# Patient Record
Sex: MALE | HISPANIC OR LATINO | URBAN - METROPOLITAN AREA
[De-identification: names, ages, dates, MRNs, and addresses within clinical notes are randomized per-mention and may not be internally consistent; named-entity substitution may affect disease eponyms.]

---

## 2021-07-05 ENCOUNTER — EMERGENCY (EMERGENCY)
Facility: HOSPITAL | Age: 31
LOS: 1 days | Discharge: HOME | End: 2021-07-05
Attending: HOSPITALIST | Admitting: HOSPITALIST
Payer: MEDICAID

## 2021-07-05 VITALS
DIASTOLIC BLOOD PRESSURE: 51 MMHG | OXYGEN SATURATION: 100 % | RESPIRATION RATE: 20 BRPM | SYSTOLIC BLOOD PRESSURE: 154 MMHG | TEMPERATURE: 98 F | HEART RATE: 118 BPM

## 2021-07-05 PROCEDURE — 99284 EMERGENCY DEPT VISIT MOD MDM: CPT

## 2021-07-05 NOTE — ED ADULT TRIAGE NOTE - CHIEF COMPLAINT QUOTE
Brought in by EMS and [facility] Police for verbalizing suicidal ideation. Pt in custody, arrived in handcuffs. Admits to drinking 12 beers. Calm and cooperative at this time. Denies drug use. Denies psych or medical hx.

## 2021-07-06 VITALS
HEART RATE: 70 BPM | OXYGEN SATURATION: 100 % | RESPIRATION RATE: 12 BRPM | TEMPERATURE: 98 F | SYSTOLIC BLOOD PRESSURE: 170 MMHG | DIASTOLIC BLOOD PRESSURE: 81 MMHG

## 2021-07-06 LAB
ALBUMIN SERPL ELPH-MCNC: 4.9 G/DL — SIGNIFICANT CHANGE UP (ref 3.3–5)
ALP SERPL-CCNC: 114 U/L — SIGNIFICANT CHANGE UP (ref 40–120)
ALT FLD-CCNC: 74 U/L — HIGH (ref 4–41)
AMPHET UR-MCNC: NEGATIVE — SIGNIFICANT CHANGE UP
ANION GAP SERPL CALC-SCNC: 15 MMOL/L — HIGH (ref 7–14)
APAP SERPL-MCNC: <15 UG/ML — SIGNIFICANT CHANGE UP (ref 15–25)
APPEARANCE UR: CLEAR — SIGNIFICANT CHANGE UP
AST SERPL-CCNC: 41 U/L — HIGH (ref 4–40)
BARBITURATES UR SCN-MCNC: NEGATIVE — SIGNIFICANT CHANGE UP
BASOPHILS # BLD AUTO: 0.04 K/UL — SIGNIFICANT CHANGE UP (ref 0–0.2)
BASOPHILS NFR BLD AUTO: 0.6 % — SIGNIFICANT CHANGE UP (ref 0–2)
BENZODIAZ UR-MCNC: NEGATIVE — SIGNIFICANT CHANGE UP
BILIRUB SERPL-MCNC: 0.2 MG/DL — SIGNIFICANT CHANGE UP (ref 0.2–1.2)
BILIRUB UR-MCNC: NEGATIVE — SIGNIFICANT CHANGE UP
BUN SERPL-MCNC: 7 MG/DL — SIGNIFICANT CHANGE UP (ref 7–23)
CALCIUM SERPL-MCNC: 9.5 MG/DL — SIGNIFICANT CHANGE UP (ref 8.4–10.5)
CHLORIDE SERPL-SCNC: 106 MMOL/L — SIGNIFICANT CHANGE UP (ref 98–107)
CO2 SERPL-SCNC: 21 MMOL/L — LOW (ref 22–31)
COCAINE METAB.OTHER UR-MCNC: NEGATIVE — SIGNIFICANT CHANGE UP
COLOR SPEC: COLORLESS — SIGNIFICANT CHANGE UP
COVID-19 SPIKE DOMAIN AB INTERP: POSITIVE
COVID-19 SPIKE DOMAIN ANTIBODY RESULT: >250 U/ML — HIGH
CREAT SERPL-MCNC: 0.88 MG/DL — SIGNIFICANT CHANGE UP (ref 0.5–1.3)
CREATININE URINE RESULT, DAU: 24 MG/DL — SIGNIFICANT CHANGE UP
DIFF PNL FLD: NEGATIVE — SIGNIFICANT CHANGE UP
EOSINOPHIL # BLD AUTO: 0.05 K/UL — SIGNIFICANT CHANGE UP (ref 0–0.5)
EOSINOPHIL NFR BLD AUTO: 0.7 % — SIGNIFICANT CHANGE UP (ref 0–6)
ETHANOL SERPL-MCNC: 209 MG/DL — HIGH
GLUCOSE SERPL-MCNC: 106 MG/DL — HIGH (ref 70–99)
GLUCOSE UR QL: NEGATIVE — SIGNIFICANT CHANGE UP
HCT VFR BLD CALC: 45.5 % — SIGNIFICANT CHANGE UP (ref 39–50)
HGB BLD-MCNC: 16.1 G/DL — SIGNIFICANT CHANGE UP (ref 13–17)
IANC: 4.2 K/UL — SIGNIFICANT CHANGE UP (ref 1.5–8.5)
IMM GRANULOCYTES NFR BLD AUTO: 0.1 % — SIGNIFICANT CHANGE UP (ref 0–1.5)
KETONES UR-MCNC: NEGATIVE — SIGNIFICANT CHANGE UP
LEUKOCYTE ESTERASE UR-ACNC: NEGATIVE — SIGNIFICANT CHANGE UP
LYMPHOCYTES # BLD AUTO: 1.94 K/UL — SIGNIFICANT CHANGE UP (ref 1–3.3)
LYMPHOCYTES # BLD AUTO: 28.8 % — SIGNIFICANT CHANGE UP (ref 13–44)
MCHC RBC-ENTMCNC: 30.7 PG — SIGNIFICANT CHANGE UP (ref 27–34)
MCHC RBC-ENTMCNC: 35.4 GM/DL — SIGNIFICANT CHANGE UP (ref 32–36)
MCV RBC AUTO: 86.7 FL — SIGNIFICANT CHANGE UP (ref 80–100)
METHADONE UR-MCNC: NEGATIVE — SIGNIFICANT CHANGE UP
MONOCYTES # BLD AUTO: 0.49 K/UL — SIGNIFICANT CHANGE UP (ref 0–0.9)
MONOCYTES NFR BLD AUTO: 7.3 % — SIGNIFICANT CHANGE UP (ref 2–14)
NEUTROPHILS # BLD AUTO: 4.2 K/UL — SIGNIFICANT CHANGE UP (ref 1.8–7.4)
NEUTROPHILS NFR BLD AUTO: 62.5 % — SIGNIFICANT CHANGE UP (ref 43–77)
NITRITE UR-MCNC: NEGATIVE — SIGNIFICANT CHANGE UP
NRBC # BLD: 0 /100 WBCS — SIGNIFICANT CHANGE UP
NRBC # FLD: 0 K/UL — SIGNIFICANT CHANGE UP
OPIATES UR-MCNC: NEGATIVE — SIGNIFICANT CHANGE UP
OXYCODONE UR-MCNC: NEGATIVE — SIGNIFICANT CHANGE UP
PCP SPEC-MCNC: SIGNIFICANT CHANGE UP
PCP UR-MCNC: NEGATIVE — SIGNIFICANT CHANGE UP
PH UR: 6 — SIGNIFICANT CHANGE UP (ref 5–8)
PLATELET # BLD AUTO: 178 K/UL — SIGNIFICANT CHANGE UP (ref 150–400)
POTASSIUM SERPL-MCNC: 4 MMOL/L — SIGNIFICANT CHANGE UP (ref 3.5–5.3)
POTASSIUM SERPL-SCNC: 4 MMOL/L — SIGNIFICANT CHANGE UP (ref 3.5–5.3)
PROT SERPL-MCNC: 7.8 G/DL — SIGNIFICANT CHANGE UP (ref 6–8.3)
PROT UR-MCNC: NEGATIVE — SIGNIFICANT CHANGE UP
RBC # BLD: 5.25 M/UL — SIGNIFICANT CHANGE UP (ref 4.2–5.8)
RBC # FLD: 11.8 % — SIGNIFICANT CHANGE UP (ref 10.3–14.5)
SALICYLATES SERPL-MCNC: <5 MG/DL — LOW (ref 15–30)
SARS-COV-2 IGG+IGM SERPL QL IA: >250 U/ML — HIGH
SARS-COV-2 IGG+IGM SERPL QL IA: POSITIVE
SARS-COV-2 RNA SPEC QL NAA+PROBE: SIGNIFICANT CHANGE UP
SODIUM SERPL-SCNC: 142 MMOL/L — SIGNIFICANT CHANGE UP (ref 135–145)
SP GR SPEC: 1.01 — LOW (ref 1.01–1.02)
THC UR QL: NEGATIVE — SIGNIFICANT CHANGE UP
TSH SERPL-MCNC: 1.76 UIU/ML — SIGNIFICANT CHANGE UP (ref 0.27–4.2)
UROBILINOGEN FLD QL: SIGNIFICANT CHANGE UP
WBC # BLD: 6.73 K/UL — SIGNIFICANT CHANGE UP (ref 3.8–10.5)
WBC # FLD AUTO: 6.73 K/UL — SIGNIFICANT CHANGE UP (ref 3.8–10.5)

## 2021-07-06 NOTE — ED PROVIDER NOTE - PATIENT PORTAL LINK FT
You can access the FollowMyHealth Patient Portal offered by [facility] by registering at the following website: [email or web address]. By joining [facility]’s FollowMyHealth portal, you will also be able to view your health information using other applications (apps) compatible with our system.

## 2021-07-06 NOTE — ED PROVIDER NOTE - NSFOLLOWUPINSTRUCTIONS_ED_ALL_ED_FT
You were seen in the Emergency Department for: suicidal thoughts, intoxication    For pain/fever, you may take Tylenol (acetaminophen) 650 mg every 6 hours, OR Advil (ibuprofen) 600 mg every 8 hours.    Please follow up with your primary physician as discussed. If you do not have a primary physician or specialist of your needs, please call [phone] to find one convenient for you. At this number you will be able to locate a provider who accepts your insurance, as well as locate the right specialist for your needs.    You should return to the Emergency Department if you feel any new/worsening/persistent symptoms including but not limited to: chest pain, difficulty breathing, loss of consciousness, bleeding, uncontrolled pain, numbness/weakness of a body part

## 2021-07-06 NOTE — ED ADULT NURSE NOTE - CHIEF COMPLAINT QUOTE
Brought in by EMS and [facility] Police for verbalizing suicidal ideation. Pt in custody, arrived in handcuffs. Admits to drinking 12 beers. Calm and cooperative at this time. Denies drug use. Denies psych or medical hx.

## 2021-07-06 NOTE — ED PROVIDER NOTE - PROGRESS NOTE DETAILS
Tanvir PGY-2: Patient reassessed. Appears clinically sober. States that he does not remember what he said previously, explicitly and clearly denies SI/HI/hallucinations. Reports that he "feels good." Wants to be discharged home. AOx4, clinically appears to be able to have capacity to make decision, will discharge with return precautions.

## 2021-07-06 NOTE — ED PROVIDER NOTE - OBJECTIVE STATEMENT
30 year old male presenting under police custody for suicidal ideation. States here "I didn't do anything, but they are saying I did, I want to kill myself". Endorses prior suicide attempts, does not give details. No known psychiatric history. Reports drinking ~6 beers. Denies hallucinations, drug use.     ID: [id number] 30 year old male presenting under police custody for suicidal ideation. States here "I didn't do anything, but they are saying I did, I want to kill myself". Endorses prior suicide attempts, does not give details. No known psychiatric history. Reports drinking ~6 beers. Denies hallucinations, drug use.      ID: [id number]

## 2021-07-06 NOTE — ED PROVIDER NOTE - ATTENDING CONTRIBUTION TO CARE
PI ID for [place] # [id number]     30M [employer] after expressing SI. patient was intoxicated earlier and was accused of inappropriately touching a young female child. Police called and patient arrested. he then stated he wanted to kill himself because he didn't want to got o [facility] again. reports hx of attempted suicide in the past. no hi/hallucinations.    ***GEN - crying, A+O x3 ***HEAD - NC/AT ***EYES/NOSE - PERRL, EOMI, mucous membranes moist, no discharge ***THROAT: Oral cavity and pharynx normal. No inflammation, swelling, exudate, or lesions.  ***NECK: Neck supple, non-tender without lymphadenopathy, no masses, no thyromegaly.   ***PULMONARY - CTA b/l, symmetric breath sounds. ***CARDIAC -s1s2, RRR, no M,G,R  ***ABDOMEN - +BS, ND, NT, soft, no guarding, no rebound, no masses   ***BACK - no CVA tenderness, Normal  spine ***EXTREMITIES - symmetric pulses, 2+ dp, capillary refill < 2 seconds, no clubbing, no cyanosis, no edema ***SKIN - no rash or bruising   ***NEUROLOGIC - alert, CN 2-12 intact    MDM: 30M with SI and currently under arrest. labs, psych eval.

## 2021-07-06 NOTE — ED ADULT NURSE NOTE - OBJECTIVE STATEMENT
Pt received to room 17 a&ox4, ambulatory at baseline c/o psych eval. Pt in custody in handcuffs. Police officers at bedside. Pt appears intoxicated at this time. Pt admits to drinking about 6 beers earlier today. As per , pt was laughing and very talkative on scene, but as soon as the pt was placed in handcuffs he stated that he wanted to kill himself. Pt states, "I want to hurt myself a little bit for about 3-4 days." Pt also states, "I sometimes want to hurt other people." Pt in no acute distress at this time. Respirations even and unlabored. MD at bedside for eval. Comfort measures provided. Pt received to room 17 a&ox4, ambulatory at baseline c/o psych eval. Pt in custody in handcuffs. Police officers at bedside. Pt appears intoxicated at this time. Pt admits to drinking about 6 beers earlier today. As per , pt was laughing and very talkative on scene, but as soon as the pt was placed in handcuffs he stated that he wanted to kill himself. Pt states, "I want to hurt myself a little bit for about 3-4 days." Pt also states, "I sometimes want to hurt other people." Pt in no acute distress at this time. Respirations even and unlabored. MD at bedside for eval. 18g IV placed in right ac.  Comfort measures provided. Belongings collected and placed across from room 21.

## 2021-07-06 NOTE — ED PROVIDER NOTE - NS ED MD EM SELECTION
[FreeTextEntry1] : HTN- uncontrolled continue / increase Carvediol to 12.5 mg BID / cardio referral\par \par screen for colon ca- FIT/ GE referral\par screen for prostates ca- PSA\par \par alcohol drinker- drinks 4-5 /day advise to cut down no more then 2/day/discussed effects of alcohol on liver sathish with high ferritin and elevated lft\par elevated ferritin- Hematology consult\par prediabetes/ hyperlipedemia- advise die\par elevated LFT- US and refrain from alcohol\par Diet and Activity: - Choose lean meats and poultry without skin and prepare them without added saturated and trans \par fat. Eat fish at least,twice a week. Recent research shows that eating oily \par fish containing omega-3,fatty acids (for example, salmon, trout and herring) \par may help lower your risk of death from coronary artery disease. Select \par fat-free, 1 percent fat and low-fat dairy products. Cut back on foods \par containing partially hydrogenated vegetable oils to reduce trans fat in your \par diet. To lower cholesterol, reduce saturated fat to no more than 5 to 6 \par percent of total calories. For someone eating 2,000 calories a day, that’s \par about 13 grams of saturated fat. Cut back on beverages and foods with added \par sugars. Choose and prepare foods with little or no salt. To lower blood \par pressure, aim to eat no more than 2,400 milligrams of sodium per day. \par Reducing daily intake to 1,500 mg is desirable because it can lower blood \par pressure even further. If you drink alcohol, drink in moderation. That means \par one drink per day if youre a woman and two drinks per day if youre a \par man Follow the American Heart Association recommendations when you eat out, \par and keep an eye on your portion sizes.\par follow up in 3 months /prn
[id number] Detailed

## 2021-07-06 NOTE — ED ADULT NURSE NOTE - NSIMPLEMENTINTERV_GEN_ALL_ED
Implemented All Fall Risk Interventions:  [place] to call system. Call bell, personal items and telephone within reach. Instruct patient to call for assistance. Room bathroom lighting operational. Non-slip footwear when patient is off stretcher. Physically safe environment: no spills, clutter or unnecessary equipment. Stretcher in lowest position, wheels locked, appropriate side rails in place. Provide visual cue, wrist band, yellow gown, etc. Monitor gait and stability. Monitor for mental status changes and reorient to person, place, and time. Review medications for side effects contributing to fall risk. Reinforce activity limits and safety measures with patient and family.

## 2021-07-06 NOTE — ED PROVIDER NOTE - PHYSICAL EXAMINATION
Gen - NAD; appears intoxicated, emotionally labile but cooperative; A+Ox3   HEENT - NCAT, EOMI  Neck - supple, no c-spine tenderness  Resp - CTAB  CV -  RRR  Abd - soft, NT, ND; no guarding or rebound  Back - no midline tenderness  MSK - grossly fully strength and FROM b/l UE and LE  Extrem - no LE edema/erythema/tenderness  Neuro - limited exam; no focal motor or sensation deficits  Psych - labile, linear thoughts, good insight, +SI, no hallucinations,

## 2021-07-06 NOTE — ED PROVIDER NOTE - CLINICAL SUMMARY MEDICAL DECISION MAKING FREE TEXT BOX
30 year old male presenting under police custody for suicidal ideation, +etoh. VS wnl, no findings of trauma on exam, pt is cooperative but emotionally labile. Low suspicion for medical pathology. Will obtain blood/urine for psych clearance labs, reassessment for sobriety, psych eval

## 2022-04-08 ENCOUNTER — APPOINTMENT (OUTPATIENT)
Dept: ORTHOPEDIC SURGERY | Facility: CLINIC | Age: 32
End: 2022-04-08
Payer: OTHER MISCELLANEOUS

## 2022-04-08 VITALS — WEIGHT: 210 LBS | HEIGHT: 68 IN | BODY MASS INDEX: 31.83 KG/M2

## 2022-04-08 PROBLEM — Z00.00 ENCOUNTER FOR PREVENTIVE HEALTH EXAMINATION: Status: ACTIVE | Noted: 2022-04-08

## 2022-04-08 PROCEDURE — 99214 OFFICE O/P EST MOD 30 MIN: CPT | Mod: 25

## 2022-04-08 PROCEDURE — 99072 ADDL SUPL MATRL&STAF TM PHE: CPT

## 2022-04-08 PROCEDURE — 20611 DRAIN/INJ JOINT/BURSA W/US: CPT | Mod: RT

## 2022-04-08 NOTE — PROCEDURE
[Large Joint Injection] : Large joint injection [Right] : of the right [Knee] : knee [Pain] : pain [Alcohol] : alcohol [Betadine] : betadine [___ cc    3mg] :  Betamethasone (Celestone) ~Vcc of 3mg [___ cc    1%] : Lidocaine ~Vcc of 1%  [] : Patient tolerated procedure well [Call if redness, pain or fever occur] : call if redness, pain or fever occur [Apply ice for 15min out of every hour for the next 12-24 hours as tolerated] : apply ice for 15 minutes out of every hour for the next 12-24 hours as tolerated [Patient was advised to rest the joint(s) for ____ days] : patient was advised to rest the joint(s) for [unfilled] days [Previous OTC use and PT nontherapeutic] : patient has tried OTC's including aspirin, Ibuprofen, Aleve, etc or prescription NSAIDS, and/or exercises at home and/or physical therapy without satisfactory response [Patient had decreased mobility in the joint] : patient had decreased mobility in the joint [Risks, benefits, alternatives discussed / Verbal consent obtained] : the risks benefits, and alternatives have been discussed, and verbal consent was obtained [Prior failure or difficult injection] : prior failure or difficult injection [All ultrasound images have been permanently captured and stored accordingly in our picture archiving and communication system] : All ultrasound images have been permanently captured and stored accordingly in our picture archiving and communication system [Visualization of the needle and placement of injection was performed without complication] : visualization of the needle and placement of injection was performed without complication [Inflammation] : inflammation

## 2022-04-08 NOTE — PHYSICAL EXAM
[Right] : right knee [4___] : quadriceps 4[unfilled]/5 [5___] : hamstring 5[unfilled]/5 [] : light touch is intact throughout [FreeTextEntry8] : minimal [TWNoteComboBox7] : flexion 130 degrees [de-identified] : extension 0 degrees

## 2022-04-08 NOTE — PHYSICAL EXAM
[Right] : right knee [4___] : quadriceps 4[unfilled]/5 [5___] : hamstring 5[unfilled]/5 [] : light touch is intact throughout [FreeTextEntry8] : minimal [TWNoteComboBox7] : flexion 130 degrees [de-identified] : extension 0 degrees

## 2022-04-08 NOTE — ASSESSMENT
[FreeTextEntry1] : s/p right knee acl with hamstring autograft on 8/10/20. mri shows inflammation in graft in tibia but skin looks benign. likely chondral wear medial compartment versus recurrent small tear. improved with visco finished on 12/31/21. some improvement but pain when running and walking a long time.\par \par patient is a .

## 2022-04-08 NOTE — HISTORY OF PRESENT ILLNESS
[2] : 2 [Burning] : burning [Full time] : Work status: full time [de-identified] : 4/8/22: folllow up right knee [] : no [FreeTextEntry1] : right knee [de-identified] : none

## 2022-04-08 NOTE — HISTORY OF PRESENT ILLNESS
[2] : 2 [Burning] : burning [Full time] : Work status: full time [de-identified] : 4/8/22: folllow up right knee [] : no [FreeTextEntry1] : right knee [de-identified] : none

## 2022-05-06 ENCOUNTER — APPOINTMENT (OUTPATIENT)
Dept: ORTHOPEDIC SURGERY | Facility: CLINIC | Age: 32
End: 2022-05-06
Payer: OTHER MISCELLANEOUS

## 2022-05-06 VITALS — WEIGHT: 210 LBS | BODY MASS INDEX: 31.83 KG/M2 | HEIGHT: 68 IN

## 2022-05-06 DIAGNOSIS — Z78.9 OTHER SPECIFIED HEALTH STATUS: ICD-10-CM

## 2022-05-06 PROCEDURE — 99213 OFFICE O/P EST LOW 20 MIN: CPT

## 2022-05-06 PROCEDURE — 99072 ADDL SUPL MATRL&STAF TM PHE: CPT

## 2022-05-06 NOTE — WORK
[Moderate Partial] : moderate partial [Does not reveal pre-existing condition(s) that may affect treatment/prognosis] : does not reveal pre-existing condition(s) that may affect treatment/prognosis [Can return to work without limitations on ______] : can return to work without limitations on [unfilled] [Unknown at this time] : : unknown at this time [Patient] : patient [No Rx restrictions] : No Rx restrictions. [I provided the services listed above] :  I provided the services listed above.

## 2022-05-06 NOTE — HISTORY OF PRESENT ILLNESS
[6] : 6 [Sharp] : sharp [Full time] : Work status: full time [] : yes [de-identified] : 4/8/22: follow up right knee\par 5/6/22: Here for follow up right knee [FreeTextEntry1] : right knee [de-identified] : [facility]

## 2022-05-06 NOTE — ASSESSMENT
[FreeTextEntry1] : s/p right knee acl with hamstring autograft on 8/10/20. mri shows inflammation in graft in tibia but skin looks benign. likely chondral wear medial compartment versus recurrent small tear. improved with visco finished on 12/31/21. worse after injection. no fevers or chills. possible new mmt.\par \par patient is a .

## 2023-02-17 ENCOUNTER — APPOINTMENT (OUTPATIENT)
Dept: ORTHOPEDIC SURGERY | Facility: CLINIC | Age: 33
End: 2023-02-17
Payer: OTHER MISCELLANEOUS

## 2023-02-17 VITALS — WEIGHT: 200 LBS | BODY MASS INDEX: 30.31 KG/M2 | HEIGHT: 68 IN

## 2023-02-17 PROCEDURE — J3490M: CUSTOM

## 2023-02-17 PROCEDURE — 99072 ADDL SUPL MATRL&STAF TM PHE: CPT

## 2023-02-17 PROCEDURE — 99213 OFFICE O/P EST LOW 20 MIN: CPT | Mod: 25

## 2023-02-17 PROCEDURE — 20611 DRAIN/INJ JOINT/BURSA W/US: CPT | Mod: RT

## 2023-02-17 NOTE — DISCUSSION/SUMMARY
[de-identified] : Progress note completed by SALONI Ragsdale under the direct supervision of Tomas Angel M.D.\par

## 2023-02-17 NOTE — PROCEDURE
[Large Joint Injection] : Large joint injection [Right] : of the right [Knee] : knee [Pain] : pain [Inflammation] : inflammation [Alcohol] : alcohol [Betadine] : betadine [Ethyl Chloride sprayed topically] : ethyl chloride sprayed topically [___ cc    6mg] :  Betamethasone (Celestone) ~Vcc of 6mg [___ cc    1%] : Lidocaine ~Vcc of 1%  [___ cc    0.5%] : Bupivacaine (Marcaine) ~Vcc of 0.5%  [] : Patient tolerated procedure well [Call if redness, pain or fever occur] : call if redness, pain or fever occur [Apply ice for 15min out of every hour for the next 12-24 hours as tolerated] : apply ice for 15 minutes out of every hour for the next 12-24 hours as tolerated [Patient was advised to rest the joint(s) for ____ days] : patient was advised to rest the joint(s) for [unfilled] days [Previous OTC use and PT nontherapeutic] : patient has tried OTC's including aspirin, Ibuprofen, Aleve, etc or prescription NSAIDS, and/or exercises at home and/or physical therapy without satisfactory response [Patient had decreased mobility in the joint] : patient had decreased mobility in the joint [Risks, benefits, alternatives discussed / Verbal consent obtained] : the risks benefits, and alternatives have been discussed, and verbal consent was obtained [Altered anatomic landmarks d/t erosive arthritis] : altered anatomic landmarks d/t erosive arthritis [All ultrasound images have been permanently captured and stored accordingly in our picture archiving and communication system] : All ultrasound images have been permanently captured and stored accordingly in our picture archiving and communication system

## 2023-02-17 NOTE — WORK
Nurse returned call. Grandmother is concerned about thicker BM. She states he can pass it easily, still has frequent BMs, no blood or mucous, no pain, vomiting, or bleeding. Reviewed if passing easily with no additional symptoms, can be normal and does not require intervention. Reviewed if concern about constipation can give 1-2oz of prune or apple juice 1x daily. Advised she return call with any additional questions or concerns.    [Moderate Partial] : moderate partial [Does not reveal pre-existing condition(s) that may affect treatment/prognosis] : does not reveal pre-existing condition(s) that may affect treatment/prognosis [Can return to work without limitations on ______] : can return to work without limitations on [unfilled] [Unknown at this time] : : unknown at this time [Patient] : patient [No Rx restrictions] : No Rx restrictions. [I provided the services listed above] :  I provided the services listed above.

## 2023-02-17 NOTE — HISTORY OF PRESENT ILLNESS
[Work related] : work related [7] : 7 [4] : 4 [Dull/Aching] : dull/aching [de-identified] : 4/8/22: follow up right knee\par 5/6/22: Here for follow up right knee\par 2/17/22: Right knee pain persists. Pain and buckling persists. MRI not performed. [FreeTextEntry3] : 2019

## 2023-02-17 NOTE — PHYSICAL EXAM
[Right] : right knee [4___] : quadriceps 4[unfilled]/5 [5___] : hamstring 5[unfilled]/5 [] : light touch is intact throughout [FreeTextEntry8] : minimal [TWNoteComboBox7] : flexion 130 degrees [de-identified] : extension 0 degrees

## 2023-02-19 ENCOUNTER — FORM ENCOUNTER (OUTPATIENT)
Age: 33
End: 2023-02-19

## 2023-02-20 ENCOUNTER — APPOINTMENT (OUTPATIENT)
Dept: MRI IMAGING | Facility: CLINIC | Age: 33
End: 2023-02-20
Payer: OTHER MISCELLANEOUS

## 2023-02-20 PROCEDURE — 99072 ADDL SUPL MATRL&STAF TM PHE: CPT

## 2023-02-20 PROCEDURE — 73721 MRI JNT OF LWR EXTRE W/O DYE: CPT | Mod: RT

## 2023-03-03 ENCOUNTER — APPOINTMENT (OUTPATIENT)
Dept: ORTHOPEDIC SURGERY | Facility: CLINIC | Age: 33
End: 2023-03-03
Payer: OTHER MISCELLANEOUS

## 2023-03-03 VITALS — WEIGHT: 200 LBS | BODY MASS INDEX: 30.31 KG/M2 | HEIGHT: 68 IN

## 2023-03-03 DIAGNOSIS — S83.511A SPRAIN OF ANTERIOR CRUCIATE LIGAMENT OF RIGHT KNEE, INITIAL ENCOUNTER: ICD-10-CM

## 2023-03-03 PROCEDURE — 99072 ADDL SUPL MATRL&STAF TM PHE: CPT

## 2023-03-03 PROCEDURE — 99214 OFFICE O/P EST MOD 30 MIN: CPT

## 2023-03-03 NOTE — HISTORY OF PRESENT ILLNESS
[Localized] : localized [Work related] : work related [7] : 7 [4] : 4 [Dull/Aching] : dull/aching [de-identified] : 4/8/22: follow up right knee\par 5/6/22: Here for follow up right knee\par 2/17/22: Right knee pain persists. Pain and buckling persists. MRI not performed.\par 3/3/23:  back to review mri.  pain still present. [FreeTextEntry1] : right knee  [FreeTextEntry3] : 2019 [de-identified] : none

## 2023-03-03 NOTE — ASSESSMENT
[FreeTextEntry1] : s/p right knee acl with hamstring autograft on 8/10/20. mri 2023 shows HO in femoral part of graft per report but graft intact.  feels stable. some inflammation on tibial part of graft but mild\par \par some success with visco in the past.\par \par patient is a .

## 2023-03-03 NOTE — PHYSICAL EXAM
[Right] : right knee [] : light touch is intact throughout [5___] : quadriceps 5[unfilled]/5 [FreeTextEntry8] : minimal [TWNoteComboBox7] : flexion 130 degrees [de-identified] : extension 0 degrees

## 2023-03-10 ENCOUNTER — APPOINTMENT (OUTPATIENT)
Dept: ORTHOPEDIC SURGERY | Facility: CLINIC | Age: 33
End: 2023-03-10
Payer: OTHER MISCELLANEOUS

## 2023-03-10 VITALS — WEIGHT: 200 LBS | BODY MASS INDEX: 30.31 KG/M2 | HEIGHT: 68 IN

## 2023-03-10 PROCEDURE — 99214 OFFICE O/P EST MOD 30 MIN: CPT | Mod: 25

## 2023-03-10 PROCEDURE — 20611 DRAIN/INJ JOINT/BURSA W/US: CPT | Mod: RT

## 2023-03-10 PROCEDURE — 99072 ADDL SUPL MATRL&STAF TM PHE: CPT

## 2023-03-10 NOTE — PHYSICAL EXAM
[Right] : right knee [5___] : hamstring 5[unfilled]/5 [] : light touch is intact throughout [FreeTextEntry8] : minimal [TWNoteComboBox7] : flexion 130 degrees [de-identified] : extension 0 degrees

## 2023-03-10 NOTE — HISTORY OF PRESENT ILLNESS
[1] : 1 [Orthovisc] : Orthovisc [de-identified] : 4/8/22: follow up right knee\par 5/6/22: Here for follow up right knee\par 2/17/22: Right knee pain persists. Pain and buckling persists. MRI not performed.\par 3/3/23:  back to review mri.  pain still present.\par 3/10/23:  follow up right knee.  pain continues  [] : This patient has had an injection before: no [FreeTextEntry1] : right knee

## 2023-03-10 NOTE — PROCEDURE
[FreeTextEntry3] : Procedure Name: Orthovisc (Large Joint) with Ultrasound Guidance\par \par Viscosupplementation Injection: X-ray evidence of Osteoarthritis on this or prior visit and Patient has tried OTC's including aspirin, Ibuprofen, Aleve etc or prescription NSAIDS, and/or exercises at home and/ or physical therapy without satisfactory response.  The risks, benefits, and alternatives to Viscosupplementation injection were explained in full to the patient. Risks outlined include but are not limited to infection, sepsis, bleeding, scarring, skin discoloration, temporary increase in pain, syncopal episode, failure to resolve symptoms, allergic reaction, and symptom recurrence. Signs and symptoms of infection reviewed and patient advised to call immediately for redness, fevers, and/or chills. Patient understood the risks. All questions were answered. \par \par Oral informed consent was obtained.   Sterile technique was utilized for the procedure including the preparation of the solutions used for the injection. An injection of Orthovisc 2ml #1 was injected into RIGHT KNEE.  Patient tolerated the procedure well. Advised to ice the injection site this evening.  Post Procedure Instructions: Patient was advised to call if redness, pain, or fever occur and apply ice for 15 min. out of every hour for the next 12-24 hours as tolerated. patient was advised to rest the joint(s) for 2 days. \par \par Ultrasound Extremity (68378) was used because of the following reasons: inflammation.\par Ultrasound Guidance was used for the following reasons: for accurate placement of needle into knee joint\par Diagnostic ultrasound was performed of the knee and is positive for synovitis.\par Ultrasound guided injection was performed of the knee, visualization of the needle and placement of injection was performed without complication.

## 2023-03-10 NOTE — DISCUSSION/SUMMARY
[de-identified] : Progress Note completed by Sarah Leon PA-C\par * Dr. Angel -- The documentation recorded in this note accurately reflects the decisions made by me during this visit.

## 2023-03-17 ENCOUNTER — APPOINTMENT (OUTPATIENT)
Dept: ORTHOPEDIC SURGERY | Facility: CLINIC | Age: 33
End: 2023-03-17
Payer: OTHER MISCELLANEOUS

## 2023-03-17 VITALS — HEIGHT: 68 IN | WEIGHT: 200 LBS | BODY MASS INDEX: 30.31 KG/M2

## 2023-03-17 PROCEDURE — 99212 OFFICE O/P EST SF 10 MIN: CPT | Mod: 25

## 2023-03-17 PROCEDURE — 20611 DRAIN/INJ JOINT/BURSA W/US: CPT | Mod: RT

## 2023-03-17 PROCEDURE — 99072 ADDL SUPL MATRL&STAF TM PHE: CPT

## 2023-03-17 NOTE — PROCEDURE
[FreeTextEntry3] : Procedure Name: Orthovisc (Large Joint) with Ultrasound Guidance\par \par Viscosupplementation Injection: X-ray evidence of Osteoarthritis on this or prior visit and Patient has tried OTC's including aspirin, Ibuprofen, Aleve etc or prescription NSAIDS, and/or exercises at home and/ or physical therapy without satisfactory response.  The risks, benefits, and alternatives to Viscosupplementation injection were explained in full to the patient. Risks outlined include but are not limited to infection, sepsis, bleeding, scarring, skin discoloration, temporary increase in pain, syncopal episode, failure to resolve symptoms, allergic reaction, and symptom recurrence. Signs and symptoms of infection reviewed and patient advised to call immediately for redness, fevers, and/or chills. Patient understood the risks. All questions were answered. \par \par Oral informed consent was obtained.   Sterile technique was utilized for the procedure including the preparation of the solutions used for the injection. An injection of Orthovisc 2ml #2 was injected into RIGHT KNEE.  Patient tolerated the procedure well. Advised to ice the injection site this evening.  Post Procedure Instructions: Patient was advised to call if redness, pain, or fever occur and apply ice for 15 min. out of every hour for the next 12-24 hours as tolerated. patient was advised to rest the joint(s) for 2 days. \par \par Ultrasound Extremity (37973) was used because of the following reasons: inflammation.\par Ultrasound Guidance was used for the following reasons: for accurate placement of needle into knee joint\par Diagnostic ultrasound was performed of the knee and is positive for synovitis.\par Ultrasound guided injection was performed of the knee, visualization of the needle and placement of injection was performed without complication.

## 2023-03-17 NOTE — DISCUSSION/SUMMARY
[de-identified] : Progress Note completed by Sarah Leon PA-C\par * Dr. Angel -- The documentation recorded in this note accurately reflects the decisions made by me during this visit.

## 2023-03-17 NOTE — PHYSICAL EXAM
[Right] : right knee [5___] : hamstring 5[unfilled]/5 [] : light touch is intact throughout [FreeTextEntry8] : minimal [TWNoteComboBox7] : flexion 130 degrees [de-identified] : extension 0 degrees

## 2023-03-17 NOTE — HISTORY OF PRESENT ILLNESS
[] : This patient has had an injection before: yes [2] : 2 [Orthovisc] : Orthovisc [de-identified] : 4/8/22: follow up right knee\par 5/6/22: Here for follow up right knee\par 2/17/22: Right knee pain persists. Pain and buckling persists. MRI not performed.\par 3/3/23:  back to review mri.  pain still present.\par 3/10/23:  follow up right knee.  pain continues \par 3/17/23:  right knee pain still.  no adverse reactions from first injection.  [FreeTextEntry1] : right knee  [de-identified] : 03/10/2023

## 2023-03-24 ENCOUNTER — APPOINTMENT (OUTPATIENT)
Dept: ORTHOPEDIC SURGERY | Facility: CLINIC | Age: 33
End: 2023-03-24
Payer: OTHER MISCELLANEOUS

## 2023-03-24 VITALS — HEIGHT: 68 IN | WEIGHT: 200 LBS | BODY MASS INDEX: 30.31 KG/M2

## 2023-03-24 PROCEDURE — 20611 DRAIN/INJ JOINT/BURSA W/US: CPT | Mod: RT

## 2023-03-24 PROCEDURE — 99212 OFFICE O/P EST SF 10 MIN: CPT | Mod: 25

## 2023-03-24 NOTE — PROCEDURE
[FreeTextEntry3] : Procedure Name: Orthovisc (Large Joint) with Ultrasound Guidance\par \par Viscosupplementation Injection: X-ray evidence of Osteoarthritis on this or prior visit and Patient has tried OTC's including aspirin, Ibuprofen, Aleve etc or prescription NSAIDS, and/or exercises at home and/ or physical therapy without satisfactory response.  The risks, benefits, and alternatives to Viscosupplementation injection were explained in full to the patient. Risks outlined include but are not limited to infection, sepsis, bleeding, scarring, skin discoloration, temporary increase in pain, syncopal episode, failure to resolve symptoms, allergic reaction, and symptom recurrence. Signs and symptoms of infection reviewed and patient advised to call immediately for redness, fevers, and/or chills. Patient understood the risks. All questions were answered. \par \par Oral informed consent was obtained.   Sterile technique was utilized for the procedure including the preparation of the solutions used for the injection. An injection of Orthovisc 2ml #3 was injected into RIGHT KNEE.  Patient tolerated the procedure well. Advised to ice the injection site this evening.  Post Procedure Instructions: Patient was advised to call if redness, pain, or fever occur and apply ice for 15 min. out of every hour for the next 12-24 hours as tolerated. patient was advised to rest the joint(s) for 2 days. \par \par Ultrasound Extremity (43119) was used because of the following reasons: inflammation.\par Ultrasound Guidance was used for the following reasons: for accurate placement of needle into knee joint\par Diagnostic ultrasound was performed of the knee and is positive for synovitis.\par Ultrasound guided injection was performed of the knee, visualization of the needle and placement of injection was performed without complication.

## 2023-03-24 NOTE — HISTORY OF PRESENT ILLNESS
[] : This patient has had an injection before: yes [3] : 3 [Orthovisc] : Orthovisc [de-identified] : 4/8/22: follow up right knee\par 5/6/22: Here for follow up right knee\par 2/17/22: Right knee pain persists. Pain and buckling persists. MRI not performed.\par 3/3/23:  back to review mri.  pain still present.\par 3/10/23:  follow up right knee.  pain continues \par 3/17/23:  right knee pain still.  no adverse reactions from first injection. \par 3/24/23: right knee pain continue. denies complication with prior injection. [FreeTextEntry1] : right knee  [de-identified] : 03/17/2023

## 2023-03-24 NOTE — DISCUSSION/SUMMARY
[de-identified] : Progress Note completed by Marci Barros PA-C\par * Dr. Angel -- The documentation recorded in this note accurately reflects the decisions made by me during this visit.

## 2023-03-24 NOTE — PHYSICAL EXAM
[Right] : right knee [5___] : hamstring 5[unfilled]/5 [] : light touch is intact throughout [FreeTextEntry8] : minimal [TWNoteComboBox7] : flexion 130 degrees [de-identified] : extension 0 degrees

## 2023-03-31 ENCOUNTER — APPOINTMENT (OUTPATIENT)
Dept: ORTHOPEDIC SURGERY | Facility: CLINIC | Age: 33
End: 2023-03-31
Payer: OTHER MISCELLANEOUS

## 2023-03-31 VITALS — WEIGHT: 200 LBS | HEIGHT: 68 IN | BODY MASS INDEX: 30.31 KG/M2

## 2023-03-31 DIAGNOSIS — M17.11 UNILATERAL PRIMARY OSTEOARTHRITIS, RIGHT KNEE: ICD-10-CM

## 2023-03-31 PROCEDURE — 99212 OFFICE O/P EST SF 10 MIN: CPT | Mod: 25

## 2023-03-31 PROCEDURE — 20611 DRAIN/INJ JOINT/BURSA W/US: CPT | Mod: RT

## 2023-03-31 NOTE — DISCUSSION/SUMMARY
[de-identified] : Progress Note completed by Marci Barros PA-C\par * Dr. Angel -- The documentation recorded in this note accurately reflects the decisions made by me during this visit.

## 2023-03-31 NOTE — PROCEDURE
[FreeTextEntry3] : Procedure Name: Orthovisc (Large Joint) with Ultrasound Guidance\par \par Viscosupplementation Injection: X-ray evidence of Osteoarthritis on this or prior visit and Patient has tried OTC's including aspirin, Ibuprofen, Aleve etc or prescription NSAIDS, and/or exercises at home and/ or physical therapy without satisfactory response.  The risks, benefits, and alternatives to Viscosupplementation injection were explained in full to the patient. Risks outlined include but are not limited to infection, sepsis, bleeding, scarring, skin discoloration, temporary increase in pain, syncopal episode, failure to resolve symptoms, allergic reaction, and symptom recurrence. Signs and symptoms of infection reviewed and patient advised to call immediately for redness, fevers, and/or chills. Patient understood the risks. All questions were answered. \par \par Oral informed consent was obtained.   Sterile technique was utilized for the procedure including the preparation of the solutions used for the injection. An injection of Orthovisc 2ml #4 was injected into RIGHT KNEE.  Patient tolerated the procedure well. Advised to ice the injection site this evening.  Post Procedure Instructions: Patient was advised to call if redness, pain, or fever occur and apply ice for 15 min. out of every hour for the next 12-24 hours as tolerated. patient was advised to rest the joint(s) for 2 days. \par \par Ultrasound Extremity (58544) was used because of the following reasons: inflammation.\par Ultrasound Guidance was used for the following reasons: for accurate placement of needle into knee joint\par Diagnostic ultrasound was performed of the knee and is positive for synovitis.\par Ultrasound guided injection was performed of the knee, visualization of the needle and placement of injection was performed without complication.

## 2023-03-31 NOTE — PHYSICAL EXAM
[Right] : right knee [5___] : hamstring 5[unfilled]/5 [] : light touch is intact throughout [FreeTextEntry8] : minimal [TWNoteComboBox7] : flexion 130 degrees [de-identified] : extension 0 degrees

## 2023-03-31 NOTE — HISTORY OF PRESENT ILLNESS
[] : This patient has had an injection before: yes [4] : 4 [Orthovisc] : Orthovisc [de-identified] : 4/8/22: follow up right knee\par 5/6/22: Here for follow up right knee\par 2/17/22: Right knee pain persists. Pain and buckling persists. MRI not performed.\par 3/3/23:  back to review mri.  pain still present.\par 3/10/23:  follow up right knee.  pain continues \par 3/17/23:  right knee pain still.  no adverse reactions from first injection. \par 3/24/23: right knee pain continue. denies complication with prior injection.\par 3/31/23: symptoms persists. denies complication with prior injection. [FreeTextEntry1] : right knee  [de-identified] : 03/24/2023

## 2023-05-12 ENCOUNTER — APPOINTMENT (OUTPATIENT)
Dept: ORTHOPEDIC SURGERY | Facility: CLINIC | Age: 33
End: 2023-05-12